# Patient Record
(demographics unavailable — no encounter records)

---

## 2024-12-12 NOTE — PLAN
[FreeTextEntry1] : F/u Breast Check: BSA, calcium, vitamin D and exercise d/w pt Breast and pelvic exam performed Advised to continue close f/u at [facility] and w/ breast surgeon Advised pt to see PCP and dermatologist annually  Hx of fibroid, ovarian cyst: Last pelvic sono 03/2024 - stable Repeat in 03/2025, pt to schedule  RTO in 1 yr

## 2024-12-12 NOTE — HISTORY OF PRESENT ILLNESS
[Patient reported bone density results were normal] : Patient reported bone density results were normal [Patient reported colonoscopy was normal] : Patient reported colonoscopy was normal [Patient reported mammogram was normal] : Patient reported mammogram was normal [FreeTextEntry1] : 2024. VEL WHALEY 69 year old female  LMP postmenopausal presents for f/u breast check, Hx of L breast DCIS 2023, GynHx fibroid, ovarian cyst.  [place] 24 [place] [facility] [place] breast surgery in  w/ left breast silicone implant,  and R breast lift. Remained stage 0. She will follow with  in 2025. Her last breast MRI was done in  prior to f/u breast surgery.  Left breast Bxs in  revealed DCIS. She saw Breast surgeon at [facility] and consulted w/ Dr. Del Real Had 26 panel genetic testing at [facility] in the past few years - pt reports all was negative and she will send to me.   She denies vaginal bleeding, abdominal and pelvic pain, no vaginal discharge or vaginitis symptoms. She reports normal urination, no dysuria, no incontinence of urine. BM is normal per patient, no blood in stool or constipation/diarrhea.  PMH: thalassemia, familial polyposis, anemia, DCIS () in L breast Meds: Caltrate, Vit D OBHx: , C/S x1, 1 full term  GynHx: abnormal pap smear x1 in the remote past, in her 40's, 1.8 cm fibroid, ovarian cyst, hx of endometriosis. Denies history of STI, pelvic infection, breast issues SHx: subtotal colectomy , LSO for endometriosis , R knee surgery , cataract surgery, C/S x1. 23: L breast biopsy x2 at 2 o'clock 5 cm from nipple and 3 o'clock 4.5 cm from nipple. DCIS intermediate to high nuclear grade. FHx: paternal grandmother and paternal aunt- breast ca. Denies FHx of ovarian, uterine pancreatic, prostate, or colon cancer All: NKDA Social: Former smoker, quit in . No drugs, tobacco, or alcohol use. Vaccine: s/p covid vaccines Pt's son passed away in 2024 at 39 y/o due to stomach cancer. Pt follows with a support group monthly. She reports adequate support at home.   [TextBox_4] : 02/23 SHG- no polyps 03/24 TVUS - uterine fibroid unchanged, small R ovarian cyst unchanged, unchanged loculated fluid in R adnexa [Mammogramdate] : 6/2024 [TextBox_19] : right breast  WNL per pteint, done at [facility] [BoneDensityDate] : 2024 [TextBox_37] : done @ PCP, wnl per pt [ColonoscopyDate] : 2024 [TextBox_43] : sigmoidoscopy - pt sees GI Q 3-4 mos due to genetic predisposition

## 2024-12-12 NOTE — PHYSICAL EXAM
[Chaperone Present] : A chaperone was present in the examining room during all aspects of the physical examination [[id number]] : A chaperone was present during the pelvic exam. [Appropriately responsive] : appropriately responsive [Alert] : alert [No Acute Distress] : no acute distress [No Lymphadenopathy] : no lymphadenopathy [Regular Rate Rhythm] : regular rate rhythm [No Murmurs] : no murmurs [Clear to Auscultation B/L] : clear to auscultation bilaterally [Soft] : soft [Non-tender] : non-tender [Non-distended] : non-distended [No HSM] : No HSM [No Lesions] : no lesions [No Mass] : no mass [Oriented x3] : oriented x3 [Examination Of The Breasts] : a normal appearance [Left Breast Absent] : a total mastectomy [No Masses] : no breast masses were palpable [Vulvar Atrophy] : vulvar atrophy [Labia Majora] : normal [Labia Minora] : normal [Atrophy] : atrophy [Normal] : normal [Uterine Adnexae] : normal [] : an implant [FreeTextEntry2] [facility] [FreeTextEntry9] : recent sigmoidoscopy